# Patient Record
Sex: FEMALE | Race: BLACK OR AFRICAN AMERICAN | NOT HISPANIC OR LATINO | URBAN - METROPOLITAN AREA
[De-identification: names, ages, dates, MRNs, and addresses within clinical notes are randomized per-mention and may not be internally consistent; named-entity substitution may affect disease eponyms.]

---

## 2024-04-23 ENCOUNTER — APPOINTMENT (RX ONLY)
Dept: URBAN - METROPOLITAN AREA CLINIC 24 | Facility: CLINIC | Age: 79
Setting detail: DERMATOLOGY
End: 2024-04-23

## 2024-04-23 DIAGNOSIS — L30.9 DERMATITIS, UNSPECIFIED: ICD-10-CM

## 2024-04-23 PROCEDURE — ? PHOTO-DOCUMENTATION

## 2024-04-23 PROCEDURE — 99204 OFFICE O/P NEW MOD 45 MIN: CPT

## 2024-04-23 PROCEDURE — ? PRESCRIPTION

## 2024-04-23 PROCEDURE — ? PRESCRIPTION MEDICATION MANAGEMENT

## 2024-04-23 PROCEDURE — ? COUNSELING

## 2024-04-23 RX ORDER — HYDROCORTISONE 25 MG/G
CREAM TOPICAL BID
Qty: 30 | Refills: 1 | Status: ERX | COMMUNITY
Start: 2024-04-23

## 2024-04-23 RX ADMIN — HYDROCORTISONE: 25 CREAM TOPICAL at 00:00

## 2024-04-23 ASSESSMENT — LOCATION ZONE DERM: LOCATION ZONE: LEG

## 2024-04-23 ASSESSMENT — LOCATION DETAILED DESCRIPTION DERM
LOCATION DETAILED: LEFT DISTAL PRETIBIAL REGION
LOCATION DETAILED: LEFT PROXIMAL PRETIBIAL REGION

## 2024-04-23 ASSESSMENT — LOCATION SIMPLE DESCRIPTION DERM: LOCATION SIMPLE: LEFT PRETIBIAL REGION

## 2024-04-23 NOTE — PROCEDURE: COUNSELING
Patient Specific Counseling (Will Not Stick From Patient To Patient): Unclear etiology however lesions appear to be resolving. Advised reevaluation in 2-3 to monitor for resolution, sooner for worsening sx or PRN. Pt agreed.
Detail Level: Detailed